# Patient Record
Sex: MALE | Race: WHITE | URBAN - METROPOLITAN AREA
[De-identification: names, ages, dates, MRNs, and addresses within clinical notes are randomized per-mention and may not be internally consistent; named-entity substitution may affect disease eponyms.]

---

## 2018-05-16 ENCOUNTER — HOSPITAL ENCOUNTER (OUTPATIENT)
Facility: CLINIC | Age: 38
Setting detail: OBSERVATION
Discharge: HOME | End: 2018-05-17
Attending: EMERGENCY MEDICINE | Admitting: HOSPITALIST
Payer: COMMERCIAL

## 2018-05-16 DIAGNOSIS — I26.99 OTHER ACUTE PULMONARY EMBOLISM WITHOUT ACUTE COR PULMONALE (H): ICD-10-CM

## 2018-05-16 PROCEDURE — 76604 US EXAM CHEST: CPT

## 2018-05-16 PROCEDURE — 99285 EMERGENCY DEPT VISIT HI MDM: CPT | Mod: 25

## 2018-05-16 PROCEDURE — 85025 COMPLETE CBC W/AUTO DIFF WBC: CPT | Performed by: EMERGENCY MEDICINE

## 2018-05-16 PROCEDURE — 85379 FIBRIN DEGRADATION QUANT: CPT | Performed by: EMERGENCY MEDICINE

## 2018-05-16 PROCEDURE — 25000128 H RX IP 250 OP 636: Performed by: EMERGENCY MEDICINE

## 2018-05-16 PROCEDURE — 86308 HETEROPHILE ANTIBODY SCREEN: CPT | Performed by: EMERGENCY MEDICINE

## 2018-05-16 PROCEDURE — 96365 THER/PROPH/DIAG IV INF INIT: CPT | Mod: 59

## 2018-05-16 PROCEDURE — 83690 ASSAY OF LIPASE: CPT | Performed by: EMERGENCY MEDICINE

## 2018-05-16 PROCEDURE — 36415 COLL VENOUS BLD VENIPUNCTURE: CPT | Performed by: EMERGENCY MEDICINE

## 2018-05-16 PROCEDURE — 96375 TX/PRO/DX INJ NEW DRUG ADDON: CPT

## 2018-05-16 PROCEDURE — 96376 TX/PRO/DX INJ SAME DRUG ADON: CPT

## 2018-05-16 PROCEDURE — 80053 COMPREHEN METABOLIC PANEL: CPT | Performed by: EMERGENCY MEDICINE

## 2018-05-16 PROCEDURE — 84484 ASSAY OF TROPONIN QUANT: CPT | Performed by: EMERGENCY MEDICINE

## 2018-05-16 RX ORDER — ONDANSETRON 2 MG/ML
4 INJECTION INTRAMUSCULAR; INTRAVENOUS ONCE
Status: COMPLETED | OUTPATIENT
Start: 2018-05-16 | End: 2018-05-16

## 2018-05-16 RX ORDER — KETOROLAC TROMETHAMINE 15 MG/ML
15 INJECTION, SOLUTION INTRAMUSCULAR; INTRAVENOUS ONCE
Status: COMPLETED | OUTPATIENT
Start: 2018-05-16 | End: 2018-05-16

## 2018-05-16 RX ADMIN — KETOROLAC TROMETHAMINE 15 MG: 15 INJECTION, SOLUTION INTRAMUSCULAR; INTRAVENOUS at 23:54

## 2018-05-16 RX ADMIN — ONDANSETRON 4 MG: 2 SOLUTION INTRAMUSCULAR; INTRAVENOUS at 23:53

## 2018-05-16 RX ADMIN — SODIUM CHLORIDE, POTASSIUM CHLORIDE, SODIUM LACTATE AND CALCIUM CHLORIDE 1000 ML: 600; 310; 30; 20 INJECTION, SOLUTION INTRAVENOUS at 23:51

## 2018-05-16 NOTE — IP AVS SNAPSHOT
[facility] Observation Department    [address]    [facility] [address]    Phone:  [phone]                                       After Visit Summary   5/16/2018    Juan Pablo Peterson    MRN: [id number]           After Visit Summary Signature Page     I have received my discharge instructions, and my questions have been answered. I have discussed any challenges I see with this plan with the nurse or doctor.    ..........................................................................................................................................  Patient/Patient Representative Signature      ..........................................................................................................................................  Patient Representative Print Name and Relationship to Patient    ..................................................               ................................................  Date                                            Time    ..........................................................................................................................................  Reviewed by Signature/Title    ...................................................              ..............................................  Date                                                            Time

## 2018-05-16 NOTE — IP AVS SNAPSHOT
MRN:[id number]                      After Visit Summary   5/16/2018    Juan Pablo Peterson    MRN: [id number]           Thank you!     Thank you for choosing [facility] for your care. Our goal is always to provide you with excellent care. Hearing back from our patients is one way we can continue to improve our services. Please take a few minutes to complete the written survey that you may receive in the mail after you visit. If you would like to speak to someone directly about your visit please contact Patient Relations at [phone]. Thank you!          Patient Information     Date Of Birth          1980        About your hospital stay     You were admitted on:  May 17, 2018 You last received care in the:  [facility] Observation Department    You were discharged on:  May 17, 2018        Reason for your hospital stay       She was admitted for chest pain and shortness of breath. You were found to have blood clots in the lung. The cause of the blood clots are unclear but you will need outpatient follow up with Hematology for a genetic work up to assess possible genetic causes of blood clot. Make an appt with your PCP in 1 week and they will need to set you up with a Hematologist through the [facility].   Meanwhile take your blood thinner as instructed.                  Who to Call     For medical emergencies, please call 911.  For non-urgent questions about your medical care, please call your primary care provider or clinic, [phone]          Attending Provider     Provider Specialty    Luis He MD Emergency Medicine    Sergey Saini DO Internal Medicine       Primary Care Provider Office Phone # Fax #    [facility] [phone]      After Care Instructions     Activity       Your activity upon discharge: activity as tolerated            Diet       Follow this diet upon discharge: Orders Placed This  "Encounter      Regular Diet Adult                  Follow-up Appointments     Follow-up and recommended labs and tests        Follow up with primary care provider, [facility], within 7 days for hospital follow- up.  The following labs/tests are recommended: Hypercaogulable studies and follow up with a Hematologist.                             Pending Results     No orders found for last 3 day(s).            Statement of Approval     Ordered          18 1156  I have reviewed and agree with all the recommendations and orders detailed in this document.  EFFECTIVE NOW     Approved and electronically signed by:  Jennifer Raphael PA-C             Admission Information     Date & Time Provider Department Dept. Phone    2018 Sergey Saini,  [facility] [place] [facility] Observation Department [phone]      Your Vitals Were     Blood Pressure Pulse Temperature Respirations Height Weight    118/66 (BP Location: Left arm) 79 98  F (36.7  C) (Oral) 20 1.905 m (6' 3\") 104.3 kg (229 lb 14.4 oz)    Pulse Oximetry BMI (Body Mass Index)                95% 28.74 kg/m2          [employer] Information     [employer] lets you send messages to your doctor, view your test results, renew your prescriptions, schedule appointments and more. To sign up, go to [email or web address] . Click on \"Log in\" on the left side of the screen, which will take you to the Welcome page. Then click on \"Sign up Now\" on the right side of the page.     You will be asked to enter the access code listed below, as well as some personal information. Please follow the directions to create your username and password.     Your access code is: [email or web address]  Expires: 8/15/2018  9:54 AM     Your access code will  in 90 days. If you need help or a new code, please call your [place] clinic or [phone].        Care EveryWhere ID     This is your Care EveryWhere ID. This could be used by other organizations to access your " [facility] medical records  [id number]        Equal Access to Services     HAYDEE KEITH : Hadii aad ku hadkavonbarrera Amin, wameida lucammy, qacadenta kamarycolin allison, shekhar monacoviktoriatonya deluna. So [facility] [phone].    ATENCIÓN: Si habla español, tiene a tolbert disposición servicios gratuitos de asistencia lingüística. Llame al [phone].    We comply with applicable federal civil rights laws and [place] laws. We do not discriminate on the basis of race, color, national origin, age, disability, sex, sexual orientation, or gender identity.               Review of your medicines      START taking        Dose / Directions    acetaminophen 325 MG tablet   Commonly known as:  TYLENOL   Used for:  Other acute pulmonary embolism without acute cor pulmonale (H)        Dose:  650 mg   Take 2 tablets (650 mg) by mouth every 4 hours as needed for mild pain   Quantity:  100 tablet   Refills:  0       * rivaroxaban ANTICOAGULANT 15 MG Tabs tablet   Commonly known as:  XARELTO   Used for:  Other acute pulmonary embolism without acute cor pulmonale (H)        Dose:  15 mg   Take 1 tablet (15 mg) by mouth 2 times daily (with meals) for 21 days   Quantity:  42 tablet   Refills:  0       * rivaroxaban ANTICOAGULANT 20 MG Tabs tablet   Commonly known as:  XARELTO   Used for:  Other acute pulmonary embolism without acute cor pulmonale (H)        Dose:  20 mg   Start taking on:  6/8/2018   Take 1 tablet (20 mg) by mouth daily (with dinner)   Quantity:  30 tablet   Refills:  1       * Notice:  This list has 2 medication(s) that are the same as other medications prescribed for you. Read the directions carefully, and ask your doctor or other care provider to review them with you.      CONTINUE these medicines which have NOT CHANGED        Dose / Directions    BUSPAR PO        Dose:  10 mg   Take 10 mg by mouth 2 times daily   Refills:  0       etanercept 25 MG vial injection kit   Commonly known as:  ENBREL        Dose:  25  mg   Inject 25 mg Subcutaneous once a week   Refills:  0       HYDROXYZINE PAMOATE PO        Dose:  25-50 mg   Take 25-50 mg by mouth 3 times daily as needed for anxiety   Refills:  0       QUEtiapine 25 MG tablet   Commonly known as:  SEROquel        Dose:  12.5-25 mg   Take 12.5-25 mg by mouth 3 times daily as needed   Refills:  0       venlafaxine 150 MG Tb24 24 hr tablet   Commonly known as:  EFFEXOR-ER        Dose:  150 mg   Take 150 mg by mouth 2 times daily   Refills:  0            Where to get your medicines      These medications were sent to [place], [place] - [address] [facility]  [address] [facility] [address]     Phone:  [phone]     rivaroxaban ANTICOAGULANT 15 MG Tabs tablet    rivaroxaban ANTICOAGULANT 20 MG Tabs tablet         Some of these will need a paper prescription and others can be bought over the counter. Ask your nurse if you have questions.     You don't need a prescription for these medications     acetaminophen 325 MG tablet                Protect others around you: Learn how to safely use, store and throw away your medicines at www.disposemymeds.org.             Medication List: This is a list of all your medications and when to take them. Check marks below indicate your daily home schedule. Keep this list as a reference.      Medications           Morning Afternoon Evening Bedtime As Needed    acetaminophen 325 MG tablet   Commonly known as:  TYLENOL   Take 2 tablets (650 mg) by mouth every 4 hours as needed for mild pain                                   BUSPAR PO   Take 10 mg by mouth 2 times daily                                      etanercept 25 MG vial injection kit   Commonly known as:  ENBREL   Inject 25 mg Subcutaneous once a week                         As directed       HYDROXYZINE PAMOATE PO   Take 25-50 mg by mouth 3 times daily as needed for anxiety                                   QUEtiapine 25 MG tablet   Commonly known  as:  SEROquel   Take 12.5-25 mg by mouth 3 times daily as needed                                   * rivaroxaban ANTICOAGULANT 15 MG Tabs tablet   Commonly known as:  XARELTO   Take 1 tablet (15 mg) by mouth 2 times daily (with meals) for 21 days   Last time this was given:  15 mg on 5/17/2018 12:01 PM                                      * rivaroxaban ANTICOAGULANT 20 MG Tabs tablet   Commonly known as:  XARELTO   Take 1 tablet (20 mg) by mouth daily (with dinner)   Start taking on:  6/8/2018   Last time this was given:  15 mg on 5/17/2018 12:01 PM                                   venlafaxine 150 MG Tb24 24 hr tablet   Commonly known as:  EFFEXOR-ER   Take 150 mg by mouth 2 times daily                                      * Notice:  This list has 2 medication(s) that are the same as other medications prescribed for you. Read the directions carefully, and ask your doctor or other care provider to review them with you.

## 2018-05-17 ENCOUNTER — APPOINTMENT (OUTPATIENT)
Dept: ULTRASOUND IMAGING | Facility: CLINIC | Age: 38
End: 2018-05-17
Attending: EMERGENCY MEDICINE
Payer: COMMERCIAL

## 2018-05-17 ENCOUNTER — APPOINTMENT (OUTPATIENT)
Dept: CT IMAGING | Facility: CLINIC | Age: 38
End: 2018-05-17
Attending: EMERGENCY MEDICINE
Payer: COMMERCIAL

## 2018-05-17 ENCOUNTER — APPOINTMENT (OUTPATIENT)
Dept: CARDIOLOGY | Facility: CLINIC | Age: 38
End: 2018-05-17
Attending: HOSPITALIST
Payer: COMMERCIAL

## 2018-05-17 VITALS
OXYGEN SATURATION: 95 % | HEART RATE: 79 BPM | RESPIRATION RATE: 20 BRPM | DIASTOLIC BLOOD PRESSURE: 66 MMHG | HEIGHT: 75 IN | SYSTOLIC BLOOD PRESSURE: 118 MMHG | WEIGHT: 229.9 LBS | BODY MASS INDEX: 28.58 KG/M2 | TEMPERATURE: 98 F

## 2018-05-17 PROBLEM — I26.99 PULMONARY EMBOLI (H): Status: ACTIVE | Noted: 2018-05-17

## 2018-05-17 LAB
ALBUMIN SERPL-MCNC: 3.1 G/DL (ref 3.4–5)
ALP SERPL-CCNC: 102 U/L (ref 40–150)
ALT SERPL W P-5'-P-CCNC: 60 U/L (ref 0–70)
ANION GAP SERPL CALCULATED.3IONS-SCNC: 10 MMOL/L (ref 3–14)
AST SERPL W P-5'-P-CCNC: 50 U/L (ref 0–45)
BASOPHILS # BLD AUTO: 0 10E9/L (ref 0–0.2)
BASOPHILS NFR BLD AUTO: 0.4 %
BILIRUB SERPL-MCNC: 0.2 MG/DL (ref 0.2–1.3)
BUN SERPL-MCNC: 17 MG/DL (ref 7–30)
CALCIUM SERPL-MCNC: 8.1 MG/DL (ref 8.5–10.1)
CHLORIDE SERPL-SCNC: 104 MMOL/L (ref 94–109)
CO2 SERPL-SCNC: 21 MMOL/L (ref 20–32)
CREAT SERPL-MCNC: 0.9 MG/DL (ref 0.66–1.25)
D DIMER PPP FEU-MCNC: 1.5 UG/ML FEU (ref 0–0.5)
DIFFERENTIAL METHOD BLD: ABNORMAL
EOSINOPHIL # BLD AUTO: 0.1 10E9/L (ref 0–0.7)
EOSINOPHIL NFR BLD AUTO: 0.5 %
ERYTHROCYTE [DISTWIDTH] IN BLOOD BY AUTOMATED COUNT: 12.5 % (ref 10–15)
ERYTHROCYTE [DISTWIDTH] IN BLOOD BY AUTOMATED COUNT: 12.5 % (ref 10–15)
GFR SERPL CREATININE-BSD FRML MDRD: >90 ML/MIN/1.7M2
GLUCOSE SERPL-MCNC: 107 MG/DL (ref 70–99)
HCT VFR BLD AUTO: 39.6 % (ref 40–53)
HCT VFR BLD AUTO: 39.7 % (ref 40–53)
HETEROPH AB SER QL: NEGATIVE
HGB BLD-MCNC: 13.2 G/DL (ref 13.3–17.7)
HGB BLD-MCNC: 13.7 G/DL (ref 13.3–17.7)
IMM GRANULOCYTES # BLD: 0 10E9/L (ref 0–0.4)
IMM GRANULOCYTES NFR BLD: 0.2 %
INTERPRETATION ECG - MUSE: NORMAL
LIPASE SERPL-CCNC: 107 U/L (ref 73–393)
LMWH PPP CHRO-ACNC: 0.49 IU/ML
LYMPHOCYTES # BLD AUTO: 1 10E9/L (ref 0.8–5.3)
LYMPHOCYTES NFR BLD AUTO: 10.2 %
MCH RBC QN AUTO: 29.8 PG (ref 26.5–33)
MCH RBC QN AUTO: 30.2 PG (ref 26.5–33)
MCHC RBC AUTO-ENTMCNC: 33.2 G/DL (ref 31.5–36.5)
MCHC RBC AUTO-ENTMCNC: 34.6 G/DL (ref 31.5–36.5)
MCV RBC AUTO: 87 FL (ref 78–100)
MCV RBC AUTO: 90 FL (ref 78–100)
MONOCYTES # BLD AUTO: 0.6 10E9/L (ref 0–1.3)
MONOCYTES NFR BLD AUTO: 6.3 %
NEUTROPHILS # BLD AUTO: 7.6 10E9/L (ref 1.6–8.3)
NEUTROPHILS NFR BLD AUTO: 82.4 %
NRBC # BLD AUTO: 0 10*3/UL
NRBC BLD AUTO-RTO: 0 /100
PLATELET # BLD AUTO: 199 10E9/L (ref 150–450)
PLATELET # BLD AUTO: 214 10E9/L (ref 150–450)
POTASSIUM SERPL-SCNC: 3.3 MMOL/L (ref 3.4–5.3)
POTASSIUM SERPL-SCNC: 3.7 MMOL/L (ref 3.4–5.3)
PROT SERPL-MCNC: 7.2 G/DL (ref 6.8–8.8)
RADIOLOGIST FLAGS: ABNORMAL
RBC # BLD AUTO: 4.43 10E12/L (ref 4.4–5.9)
RBC # BLD AUTO: 4.54 10E12/L (ref 4.4–5.9)
SODIUM SERPL-SCNC: 135 MMOL/L (ref 133–144)
TROPONIN I SERPL-MCNC: <0.015 UG/L (ref 0–0.04)
WBC # BLD AUTO: 9.3 10E9/L (ref 4–11)
WBC # BLD AUTO: 9.3 10E9/L (ref 4–11)

## 2018-05-17 PROCEDURE — 93306 TTE W/DOPPLER COMPLETE: CPT | Mod: 26 | Performed by: INTERNAL MEDICINE

## 2018-05-17 PROCEDURE — 36415 COLL VENOUS BLD VENIPUNCTURE: CPT | Performed by: PHYSICIAN ASSISTANT

## 2018-05-17 PROCEDURE — 99236 HOSP IP/OBS SAME DATE HI 85: CPT | Performed by: HOSPITALIST

## 2018-05-17 PROCEDURE — 25000128 H RX IP 250 OP 636: Performed by: HOSPITALIST

## 2018-05-17 PROCEDURE — 36415 COLL VENOUS BLD VENIPUNCTURE: CPT | Performed by: HOSPITALIST

## 2018-05-17 PROCEDURE — 85027 COMPLETE CBC AUTOMATED: CPT | Performed by: HOSPITALIST

## 2018-05-17 PROCEDURE — 25000128 H RX IP 250 OP 636: Performed by: EMERGENCY MEDICINE

## 2018-05-17 PROCEDURE — 85520 HEPARIN ASSAY: CPT | Performed by: HOSPITALIST

## 2018-05-17 PROCEDURE — 93306 TTE W/DOPPLER COMPLETE: CPT

## 2018-05-17 PROCEDURE — G0378 HOSPITAL OBSERVATION PER HR: HCPCS

## 2018-05-17 PROCEDURE — 84132 ASSAY OF SERUM POTASSIUM: CPT | Performed by: PHYSICIAN ASSISTANT

## 2018-05-17 PROCEDURE — 71260 CT THORAX DX C+: CPT

## 2018-05-17 PROCEDURE — 99207 ZZC CDG-CODE CATEGORY CHANGED: CPT | Performed by: HOSPITALIST

## 2018-05-17 PROCEDURE — 96366 THER/PROPH/DIAG IV INF ADDON: CPT

## 2018-05-17 PROCEDURE — 93970 EXTREMITY STUDY: CPT

## 2018-05-17 PROCEDURE — 25000132 ZZH RX MED GY IP 250 OP 250 PS 637: Performed by: PHYSICIAN ASSISTANT

## 2018-05-17 PROCEDURE — 25000132 ZZH RX MED GY IP 250 OP 250 PS 637: Performed by: HOSPITALIST

## 2018-05-17 RX ORDER — VENLAFAXINE HYDROCHLORIDE 150 MG/1
150 TABLET, EXTENDED RELEASE ORAL 2 TIMES DAILY
COMMUNITY

## 2018-05-17 RX ORDER — NITROGLYCERIN 0.4 MG/1
0.4 TABLET SUBLINGUAL EVERY 5 MIN PRN
Status: DISCONTINUED | OUTPATIENT
Start: 2018-05-17 | End: 2018-05-17 | Stop reason: HOSPADM

## 2018-05-17 RX ORDER — BISACODYL 10 MG
10 SUPPOSITORY, RECTAL RECTAL DAILY PRN
Status: DISCONTINUED | OUTPATIENT
Start: 2018-05-17 | End: 2018-05-17 | Stop reason: HOSPADM

## 2018-05-17 RX ORDER — LIDOCAINE 40 MG/G
CREAM TOPICAL
Status: DISCONTINUED | OUTPATIENT
Start: 2018-05-17 | End: 2018-05-17

## 2018-05-17 RX ORDER — ACETAMINOPHEN 650 MG/1
650 SUPPOSITORY RECTAL EVERY 4 HOURS PRN
Status: DISCONTINUED | OUTPATIENT
Start: 2018-05-17 | End: 2018-05-17 | Stop reason: HOSPADM

## 2018-05-17 RX ORDER — ONDANSETRON 4 MG/1
4 TABLET, ORALLY DISINTEGRATING ORAL EVERY 6 HOURS PRN
Status: DISCONTINUED | OUTPATIENT
Start: 2018-05-17 | End: 2018-05-17 | Stop reason: HOSPADM

## 2018-05-17 RX ORDER — ONDANSETRON 2 MG/ML
4 INJECTION INTRAMUSCULAR; INTRAVENOUS EVERY 6 HOURS PRN
Status: DISCONTINUED | OUTPATIENT
Start: 2018-05-17 | End: 2018-05-17 | Stop reason: HOSPADM

## 2018-05-17 RX ORDER — ACETAMINOPHEN 325 MG/1
650 TABLET ORAL EVERY 4 HOURS PRN
Status: DISCONTINUED | OUTPATIENT
Start: 2018-05-17 | End: 2018-05-17 | Stop reason: HOSPADM

## 2018-05-17 RX ORDER — POTASSIUM CHLORIDE 29.8 MG/ML
20 INJECTION INTRAVENOUS
Status: DISCONTINUED | OUTPATIENT
Start: 2018-05-17 | End: 2018-05-17 | Stop reason: HOSPADM

## 2018-05-17 RX ORDER — MAGNESIUM SULFATE HEPTAHYDRATE 40 MG/ML
4 INJECTION, SOLUTION INTRAVENOUS EVERY 4 HOURS PRN
Status: DISCONTINUED | OUTPATIENT
Start: 2018-05-17 | End: 2018-05-17 | Stop reason: HOSPADM

## 2018-05-17 RX ORDER — POTASSIUM CHLORIDE 7.45 MG/ML
10 INJECTION INTRAVENOUS
Status: DISCONTINUED | OUTPATIENT
Start: 2018-05-17 | End: 2018-05-17 | Stop reason: HOSPADM

## 2018-05-17 RX ORDER — ACETAMINOPHEN 325 MG/1
650 TABLET ORAL EVERY 4 HOURS PRN
Qty: 100 TABLET | Refills: 0 | COMMUNITY
Start: 2018-05-17

## 2018-05-17 RX ORDER — POTASSIUM CL/LIDO/0.9 % NACL 10MEQ/0.1L
10 INTRAVENOUS SOLUTION, PIGGYBACK (ML) INTRAVENOUS
Status: DISCONTINUED | OUTPATIENT
Start: 2018-05-17 | End: 2018-05-17 | Stop reason: HOSPADM

## 2018-05-17 RX ORDER — POTASSIUM CHLORIDE 1500 MG/1
20-40 TABLET, EXTENDED RELEASE ORAL
Status: DISCONTINUED | OUTPATIENT
Start: 2018-05-17 | End: 2018-05-17 | Stop reason: HOSPADM

## 2018-05-17 RX ORDER — IOPAMIDOL 755 MG/ML
500 INJECTION, SOLUTION INTRAVASCULAR ONCE
Status: COMPLETED | OUTPATIENT
Start: 2018-05-17 | End: 2018-05-17

## 2018-05-17 RX ORDER — POTASSIUM CHLORIDE 1.5 G/1.58G
20-40 POWDER, FOR SOLUTION ORAL
Status: DISCONTINUED | OUTPATIENT
Start: 2018-05-17 | End: 2018-05-17 | Stop reason: HOSPADM

## 2018-05-17 RX ORDER — OXYCODONE HYDROCHLORIDE 5 MG/1
5 TABLET ORAL EVERY 4 HOURS PRN
Status: DISCONTINUED | OUTPATIENT
Start: 2018-05-17 | End: 2018-05-17 | Stop reason: HOSPADM

## 2018-05-17 RX ORDER — QUETIAPINE FUMARATE 25 MG/1
12.5-25 TABLET, FILM COATED ORAL 3 TIMES DAILY PRN
COMMUNITY

## 2018-05-17 RX ORDER — POLYETHYLENE GLYCOL 3350 17 G/17G
17 POWDER, FOR SOLUTION ORAL DAILY PRN
Status: DISCONTINUED | OUTPATIENT
Start: 2018-05-17 | End: 2018-05-17 | Stop reason: HOSPADM

## 2018-05-17 RX ORDER — NALOXONE HYDROCHLORIDE 0.4 MG/ML
.1-.4 INJECTION, SOLUTION INTRAMUSCULAR; INTRAVENOUS; SUBCUTANEOUS
Status: DISCONTINUED | OUTPATIENT
Start: 2018-05-17 | End: 2018-05-17 | Stop reason: HOSPADM

## 2018-05-17 RX ADMIN — OXYCODONE HYDROCHLORIDE 5 MG: 5 TABLET ORAL at 06:44

## 2018-05-17 RX ADMIN — SODIUM CHLORIDE 65 ML: 9 INJECTION, SOLUTION INTRAVENOUS at 01:01

## 2018-05-17 RX ADMIN — Medication 7200 UNITS: at 02:32

## 2018-05-17 RX ADMIN — POTASSIUM CHLORIDE 40 MEQ: 1500 TABLET, EXTENDED RELEASE ORAL at 03:28

## 2018-05-17 RX ADMIN — IOPAMIDOL 100 ML: 755 INJECTION, SOLUTION INTRAVENOUS at 01:01

## 2018-05-17 RX ADMIN — HEPARIN SODIUM 1600 UNITS/HR: 10000 INJECTION, SOLUTION INTRAVENOUS at 03:24

## 2018-05-17 RX ADMIN — HEPARIN SODIUM 1600 UNITS/HR: 10000 INJECTION, SOLUTION INTRAVENOUS at 02:37

## 2018-05-17 RX ADMIN — RIVAROXABAN 15 MG: 15 TABLET, FILM COATED ORAL at 12:01

## 2018-05-17 RX ADMIN — POTASSIUM CHLORIDE 20 MEQ: 1500 TABLET, EXTENDED RELEASE ORAL at 05:51

## 2018-05-17 ASSESSMENT — ENCOUNTER SYMPTOMS
SORE THROAT: 0
APPETITE CHANGE: 1
COUGH: 1
ABDOMINAL PAIN: 1
VOMITING: 1
CHILLS: 1
SHORTNESS OF BREATH: 1
CHEST TIGHTNESS: 1
FEVER: 1
DIAPHORESIS: 1
NAUSEA: 1
CONSTIPATION: 1
LIGHT-HEADEDNESS: 1

## 2018-05-17 NOTE — PHARMACY-ADMISSION MEDICATION HISTORY
Admission medication history interview status for this patient is complete. See [facility] admission navigator for allergy information, prior to admission medications and immunization status.     Medication history interview source(s):Patient  Medication history resources (including written lists, pill bottles, clinic record): epic list, Rx bottles  Primary pharmacy:[facility] -[phone]    Changes made to PTA medication list:  Added: doses for hydroxyzine, quetiapine and venlafaxine  Deleted: none  Changed: none  Enbrel: didn't take yesterday, as was told not to take when feeling ill/infection.    Actions taken by pharmacist (provider contacted, etc):None     Additional medication history information:None    Medication reconciliation/reorder completed by provider prior to medication history? Yes    Do you take OTC medications (eg tylenol, ibuprofen, fish oil, eye/ear drops, etc)? N(Y/N)    For patients on insulin therapy: N (Y/N)  Lantus/levemir/NPH/Mix 70/30 dose:   (Y/N) (see Med list for doses)   Sliding scale Novolog Y/N  If Yes, do you have a baseline novolog pre-meal dose:  units with meals  Patients eat three meals a day:   Y/N    How many episodes of hypoglycemia do you have per week: _______  How many missed doses do you have per week: ______  How many times do you check your blood glucose per day: _______   Any Barriers to therapy - Be specific :  cost of medications, comfortable with giving injections (if applicable), comfortable and confident with current diabetes regimen: Y/N ______________      Prior to Admission medications    Medication Sig Last Dose Taking? Auth Provider   BusPIRone HCl (BUSPAR PO) Take 10 mg by mouth 2 times daily 5/16/2018 at Unknown time Yes Reported, Patient   etanercept (ENBREL) 25 MG vial injection kit Inject 25 mg Subcutaneous once a week 5/9/2018 at Unknown time Yes Reported, Patient   HYDROXYZINE PAMOATE PO Take 25-50 mg by mouth 3 times daily as needed for anxiety   5/16/2018 at Unknown time Yes Reported, Patient   QUEtiapine (SEROQUEL) 25 MG tablet Take 12.5-25 mg by mouth 3 times daily as needed 5/16/2018 at Unknown time Yes Unknown, Entered By History   venlafaxine (EFFEXOR-ER) 150 MG TB24 24 hr tablet Take 150 mg by mouth 2 times daily 5/16/2018 at Unknown time Yes Unknown, Entered By History

## 2018-05-17 NOTE — PLAN OF CARE
Problem: Patient Care Overview  Goal: Plan of Care/Patient Progress Review  Outcome: No Change  PRIMARY DIAGNOSIS: pulmonary embolus  OUTPATIENT/OBSERVATION GOALS TO BE MET BEFORE DISCHARGE:  1. ADLs back to baseline: Yes    2. Activity and level of assistance: Ambulating independently.    3. Pain status: states has minimal pain    4. Return to near baseline physical activity: Yes    5. Started heparin gtt after bolus received in ED, plan will be for bridging of anti coagulant for home       Discharge Planner Nurse   Safe discharge environment identified: Yes  Barriers to discharge: No       Entered by: Nikkie Thomas 05/17/2018 3:32 AM     Please review provider order for any additional goals.   Nurse to notify provider when observation goals have been met and patient is ready for discharge.

## 2018-05-17 NOTE — PLAN OF CARE
Problem: Patient Care Overview  Goal: Plan of Care/Patient Progress Review  Outcome: Improving  PRIMARY DIAGNOSIS: PE  OUTPATIENT/OBSERVATION GOALS TO BE MET BEFORE DISCHARGE:  1. ADLs back to baseline: Yes    2. Activity and level of assistance: Ambulating independently.    3. Pain status: Improved-controlled with oral pain medications.    4. Return to near baseline physical activity: Yes     Discharge Planner Nurse   Safe discharge environment identified: Yes  Barriers to discharge: No       Entered by: Nettie Cassidy 05/17/2018 11:55 AM     Please review provider order for any additional goals.   Nurse to notify provider when observation goals have been met and patient is ready for discharge.

## 2018-05-17 NOTE — ED PROVIDER NOTES
History     Chief Complaint:  Shortness of Breath and Chest Pain    HPI   Juan Pablo Peterson is a 37 year old male with a history of pericardial effusion who presents with shortness of breath and chest pain. The patient reports that he had pericarditis likely due to viral infection in 2009; at that time, he was given steroids and ibuprofen and monitored closely as his symptoms resolved without complications. The patient reports feeling fine recently though has been feeling tired and sleeping up to 12 hours a day for the past month. For the past 3 days, the patient has had decreased appetite due to nausea and vomiting, diarrhea without black or bloody stools, dry cough, and fever which has resolved. He additionally notes chest tightness with deep breathing, lightheadedness with standing, and worsening shortness of breath with exertion and laying flat. He states that he has had difficulty sleeping due to abdominal or side discomfort for the past 3 days with alternating chills and sweats. Today he noted feeling dry in the mouth despite good fluid intake. He denies sore throat. His daughter had similar flu like symptoms last week. Of note, the patient has no history of mononucleosis in the past.     CARDIAC RISK FACTORS:  Sex:    Male   Tobacco:   No   Hypertension:   No   Hyperlipidemia:  No   Diabetes:   No   Family History:  No     PE/DVT RISK FACTORS:  Sex:    Male   Tobacco:   No   Cancer:   No   Travel:   No   Surgery:   No   Personal history:  No   Family history:  No       Allergies:  No known drug allergies      Medications:    Buspar  Enbrel  Hydroxyzine  Quetiapine fumarate  Venlafaxine     Past Medical History:    Ankylosing spondylitis  Anxiety  Depressive disorder  Pericardial effusion    Past Surgical History:    History reviewed. No pertinent surgical history.     Family History:    History review. No contributing family history.     Social History:  Smoking status: no  Alcohol use: no   PCP: No primary  "care provider on file.   Patient presents with family     Review of Systems   Constitutional: Positive for appetite change, chills, diaphoresis and fever (resolved).   HENT: Negative for sore throat.    Respiratory: Positive for cough, chest tightness and shortness of breath.    Cardiovascular: Positive for chest pain.   Gastrointestinal: Positive for abdominal pain, constipation, nausea and vomiting.   Neurological: Positive for light-headedness.   All other systems reviewed and are negative.    Physical Exam     Patient Vitals for the past 24 hrs:   BP Temp Heart Rate Resp SpO2 Height Weight   05/17/18 0115 121/72 - - - 98 % - -   05/17/18 0045 129/76 - 81 21 95 % - -   05/17/18 0030 128/77 - 84 16 97 % - -   05/17/18 0015 133/76 - 86 27 95 % - -   05/17/18 0000 - - 84 21 96 % - -   05/16/18 2345 - - 85 27 95 % - -   05/16/18 2330 - - 87 (!) 31 95 % - -   05/16/18 2315 - - 89 (!) 34 96 % - -   05/16/18 2300 122/77 - 85 28 96 % - -   05/16/18 2245 - - - - 96 % - -   05/16/18 2235 122/79 96.6  F (35.9  C) 105 18 97 % 1.905 m (6' 3\") 102.1 kg (225 lb)     Physical Exam   HENT:   Right Ear: External ear normal.   Left Ear: External ear normal.   Nose: Nose normal.   Eyes: Conjunctivae and lids are normal.   Neck: Neck supple. No tracheal deviation present.   Cardiovascular: Regular rhythm and intact distal pulses.    No murmur heard.  tachycardic   Pulmonary/Chest: He has no wheezes. He has no rales.   tachypnea   Abdominal: Soft. There is no tenderness. There is no rebound and no guarding.   Musculoskeletal:   No peripheral edema   Neurological:   MAEE, no gross focal motor or sensory deficit   Skin: Skin is warm. He is diaphoretic.   Psychiatric: He has a normal mood and affect.   Nursing note and vitals reviewed.        Emergency Department Course   ECG (22:44):  Rate 95 bpm. AZ interval 156. QT/QTc 320/402.   Normal sinus rhythm. Possible inferior infarct, age undetermined. Abnormal ECG  Interpreted at 2249 by " Luis He    Imaging:  Radiographic findings were communicated with the patient who voiced understanding of the findings.    CT-scan Chest/Abdomen/Pelvis w/ contrast:  1. Study is positive for a few small bilateral pulmonary emboli.  2. Moderate small bowel and colonic fluid which may be due to  enteritis.  Preliminary result per radiology.      Ultrasound Lower Extremity Venous Duplex Bilateral  Pending     Laboratory:  CBC: WNL (WBC 9.3, HGB 13.7, )   CMP: Potassium 3.3 (L), Glucose 107 (H), Calcium 8.1 (L), Albumin 3.1 (L), AST 50 (H), o/w WNL (Creatinine 0.90)  D dimer: 1.5 (H)  Lipase: 107  Mononucleosis: Negative  Troponin: <0.015    Procedures:  Results for orders placed during the hospital encounter of 05/16/18   POC US CHEST B-SCAN    Impression [facility] Procedure Note      Limited Bedside ED Cardiac Ultrasound:    PROCEDURE: PERFORMED BY: Dr. Luis He  INDICATIONS/SYMPTOM:  Chest Pain and Shortness of Breath  PROBE: Cardiac phased array probe  BODY LOCATION: Chest  FINDINGS:   The ultrasound was performed utilizing the parasternal long axis and apical 4 chamber views.  Cardiac contractility:  Present  Gross estimation of cardiac kinesis: normal  Pericardial Effusion:  None  RV:LV ratio: LV > RV  + lung sliding bilateral, no pleural effusion, RUQ no cholelithiasis, no RUQ ff.     INTERPRETATION:    Chamber size and motion were grossly normal with LV > RV, normal cardiac kinesis.  No pericardial effusion was found. No Ptx, or pleural effusion, limited RUQ views show no cholelithiasis or free fluid    IMAGE DOCUMENTATION: Images were archived to PACs system.              Interventions:  2351: Lactated ringers bolus 1000 mL IV  2353: Zofran 4mg IV   2354: Toradol 15 mg IV  Heparin pending     Emergency Department Course:  Past medical records, nursing notes, and vitals reviewed.  2251: I performed an exam of the patient and obtained history, as documented above. GCS  15.  IV inserted and blood drawn.   The patient was taken for CT, see imaging results above.    0029: I rechecked and updated the patient.   0127: I discussed results of the CT with Dr. Garcia  Findings and plan explained to the Patient who consents to admission.     0159: Discussed the patient with Dr. Saini, who will admit the patient to a bed for further monitoring, evaluation, and treatment.      Impression & Plan      Medical Decision Making:  Juan Pablo Peterson is a 37 year old male who presents with chest pain and shortness of breath found to have bilateral pulmonary emboli. No hypoxia and troponin is negative. No obvious rate heart screen on limited bedside echo here. Etiology of his clot is unclear as he has no classic risk factors. We will do ultrasound of his lower extremities and start him on anticoagulation. Recommended for admission for symptom management as well as consideration of further workup given his young age and lack of classic risk factors. He does have a low PESI score.      Diagnosis:    ICD-10-CM    1. Other acute pulmonary embolism without acute cor pulmonale (H) I26.99        Disposition:  Admitted to Dr. Parveen Mccall  5/16/2018   [facility] EMERGENCY DEPARTMENT  Amanda BANEGAS, am serving as a scribe at 10:51 PM on 5/16/2018 to document services personally performed by Luis He based on my observations and the provider's statements to me.        Luis He MD  05/17/18 0319

## 2018-05-17 NOTE — DISCHARGE SUMMARY
[facility] Outpatient / Observation Unit  Discharge Summary        Juan Pablo Peterson MRN# [id number]   YOB: 1980 Age: 37 year old     Date of Admission:  5/16/2018  Date of Discharge:  5/17/2018 12:54 PM  Admitting Physician:  Sergey Saini DO  Discharge Physician: Jennifer Raphael PA-C  Discharging Service: Hospitalist      Primary Provider: Clinic, [facility]  Primary Care Physician Phone Number: [phone]         Primary Discharge Diagnoses:    Juan Pablo Peterson was admitted on 5/16/2018 for acute PE    1. Acute BL PE-unprovoked        Secondary Discharge Diagnoses:     Past Medical History:   Diagnosis Date     Ankylosing spondylitis (H)      Anxiety      Depressive disorder      Pericardial effusion                 Code Status:      Full Code        Brief Hospital Summary:        Reason for your hospital stay       She was admitted for chest pain and shortness of breath. You were found   to have blood clots in the lung. The cause of the blood clots are unclear   but you will need outpatient follow up with Hematology for a genetic work   up to assess possible genetic causes of blood clot. Make an appt with your   PCP in 1 week and they will need to set you up with a Hematologist through   the [facility]. Meanwhile take your blood thinner as instructed.          Please refer to initial admission history and physical for further details.   Briefly, Juan Pablo Peterson is a 37 year old male with a history of ankylosing spondylitis, arthritis, anxiety, depression, and pericardial effusion with viral pericarditis who presents with chest pain and shortness of breath found to have bilateral pulmonary emboli.     1.  Unprovoked bilateral pulmonary emboli: No obvious provoking factor by history.  He is currently hemodynamically stable without any indication for thrombolytics nor IVC filter.  Bilateral leg ultrasound is negative.  Given the lack of identification of a provoking  etiology, a hypercoagulable workup is indicated.  I have recommended that he follow with hematology as an outpatient to have this performed.  An ECHO was performed and no signs of right heart strain.   He was placed on  Xarelto and he needs to follow up with PCP in 1 week and obtain a referral to Hematology with PN.     2.  Suspected recent viral gastroenteritis: Patient describes some vague GI symptoms such as nausea, vomiting, and diarrhea.  He denies fever over the past few days but did have low-grade fever over the weekend.  In any event his GI symptoms have resolved.     3.  Ankylosing spondylitis and arthritis: Chronically takes Enbrel. Resume as outpt.      4.  Depression and anxiety: stable.      5.  Previous viral pericarditis: No effusion was seen in CT scan or ED ultrasound.  ECHO negative for right heart strain.      6.  Hypokalemia: resolved.            Significant Lab During Hospitalization:        Recent Labs  Lab 05/17/18  0318 05/16/18  2355   WBC 9.3 9.3   HGB 13.2* 13.7   HCT 39.7* 39.6*   MCV 90 87    199       Recent Labs  Lab 05/17/18  0907 05/16/18  2355   NA  --  135   POTASSIUM 3.7 3.3*   CHLORIDE  --  104   CO2  --  21   ANIONGAP  --  10   GLC  --  107*   BUN  --  17   CR  --  0.90   GFRESTIMATED  --  >90   GFRESTBLACK  --  >90   DORIAN  --  8.1*     No results for input(s): CULT in the last 168 hours.             Significant Imaging During Hospitalization:      Results for orders placed or performed during the hospital encounter of 05/16/18   POC US CHEST B-SCAN    Impression    [facility] Procedure Note      Limited Bedside ED Cardiac Ultrasound:    PROCEDURE: PERFORMED BY: Dr. Luis He  INDICATIONS/SYMPTOM:  Chest Pain and Shortness of Breath  PROBE: Cardiac phased array probe  BODY LOCATION: Chest  FINDINGS:   The ultrasound was performed utilizing the parasternal long axis and apical 4 chamber views.  Cardiac contractility:  Present  Gross estimation of cardiac  kinesis: normal  Pericardial Effusion:  None  RV:LV ratio: LV > RV  + lung sliding bilateral, no pleural effusion, RUQ no cholelithiasis, no RUQ ff.     INTERPRETATION:    Chamber size and motion were grossly normal with LV > RV, normal cardiac kinesis.  No pericardial effusion was found. No Ptx, or pleural effusion, limited RUQ views show no cholelithiasis or free fluid    IMAGE DOCUMENTATION: Images were archived to PACs system.         CT Chest/Abdomen/Pelvis w Contrast     Value    Radiologist flags Pulmonary embolism (AA)    Narrative    CT CHEST/ABDOMEN/PELVIS W CONTRAST 5/17/2018 1:16 AM     HISTORY: Chest pain and dyspnea. Positive d-dimer. Possible pulmonary  embolism. Patient also has right upper quadrant and right lower  quadrant abdominal pain.     TECHNIQUE: Volumetric acquisition through chest, abdomen and pelvis  with IV contrast.   100mL Isovue-370  Radiation dose for this scan was  reduced using automated exposure control, adjustment of the mA and/or  kV according to patient size, or iterative reconstruction technique.    COMPARISON: None.    FINDINGS:   Chest: There is a small pulmonary embolus in the left upper lobe  pulmonary artery extending slightly into left upper lobe and lingular  branch vessels. There are 2 small subtle emboli in the right upper  lung. Small embolus in the right middle lobe. Some of the peripheral  pulmonary arteries are not optimally opacified and there is some  motion artifact. No mediastinal or hilar abnormality. No acute  infiltrates, pleural effusions or pneumothorax. Linear atelectasis or  scarring right lower lung.    Abdomen and pelvis: Liver, gallbladder, spleen, pancreas, adrenal  glands and kidneys demonstrate no worrisome findings. Small cyst lower  right kidney. No hydronephrosis.    Pelvic structures within normal limits. Moderate bowel fluid including  fluid throughout the colon. No focal inflammatory changes. Multiple  small mesenteric lymph nodes. No bowel  obstruction or ascites.      Impression    IMPRESSION:  1. Study is positive for a few small bilateral pulmonary emboli.  2. Moderate small bowel and colonic fluid which may be due to  enteritis.    [Critical Result: Pulmonary embolism]    Finding was identified on 5/17/2018 1:19 AM.     Dr. He was contacted by me on 5/17/2018 1:27 AM and verbalized  understanding of the critical result.     DEREK GASTON MD   US Lower Extremity Venous Duplex Bilateral    Narrative    US LOWER EXTREMITY VENOUS DUPLEX BILATERAL 5/17/2018 2:48 AM     HISTORY: New diagnosis of pulmonary embolism.    TECHNIQUE: Venous Doppler US of bilateral lower extremities.? Color  flow and spectral Doppler with waveform analysis performed.    COMPARISON: CT 5/17/2018.    FINDINGS: Ultrasound of both legs demonstrates no deep vein thrombus  from the common femoral through popliteal veins or in the visualized  segments of posterior tibial or peroneal veins in the calves.      Impression    IMPRESSION: No DVT identified in either leg.    DEREK GASTON MD              Pending Results:        Unresulted Labs Ordered in the Past 30 Days of this Admission     No orders found for last 61 day(s).              Consultations This Hospital Stay:      No consultations were requested during this admission         Discharge Instructions and Follow-Up:      Follow-up Appointments     Follow-up and recommended labs and tests        Follow up with primary care provider, [facility],   within 7 days for hospital follow- up.  The following labs/tests are   recommended: Hypercaogulable studies and follow up with a Hematologist.                    Pt instructed to follow up with PCP in 7 days, consider outpatient Hypercoagulable work up if no DVT provoking factors found.   Follow up appoint in 1-2 days with INR clinic          Discharge Disposition:      Discharged to home         Discharge Medications:        Current Discharge Medication  "List      START taking these medications    Details   acetaminophen (TYLENOL) 325 MG tablet Take 2 tablets (650 mg) by mouth every 4 hours as needed for mild pain  Qty: 100 tablet, Refills: 0    Associated Diagnoses: Other acute pulmonary embolism without acute cor pulmonale (H)      !! rivaroxaban ANTICOAGULANT (XARELTO) 15 MG TABS tablet Take 1 tablet (15 mg) by mouth 2 times daily (with meals) for 21 days  Qty: 42 tablet, Refills: 0    Associated Diagnoses: Other acute pulmonary embolism without acute cor pulmonale (H)      !! rivaroxaban ANTICOAGULANT (XARELTO) 20 MG TABS tablet Take 1 tablet (20 mg) by mouth daily (with dinner)  Qty: 30 tablet, Refills: 1    Associated Diagnoses: Other acute pulmonary embolism without acute cor pulmonale (H)       !! - Potential duplicate medications found. Please discuss with provider.      CONTINUE these medications which have NOT CHANGED    Details   BusPIRone HCl (BUSPAR PO) Take 10 mg by mouth 2 times daily      etanercept (ENBREL) 25 MG vial injection kit Inject 25 mg Subcutaneous once a week      HYDROXYZINE PAMOATE PO Take 25-50 mg by mouth 3 times daily as needed for anxiety       QUEtiapine (SEROQUEL) 25 MG tablet Take 12.5-25 mg by mouth 3 times daily as needed      venlafaxine (EFFEXOR-ER) 150 MG TB24 24 hr tablet Take 150 mg by mouth 2 times daily                 Allergies:       No Known Allergies        Condition and Physical on Discharge:      Discharge condition: Stable   Vitals: Blood pressure 125/65, pulse 79, temperature 98.4  F (36.9  C), temperature source Oral, resp. rate 20, height 1.905 m (6' 3\"), weight 104.3 kg (229 lb 14.4 oz), SpO2 97 %.  229 lbs 14.4 oz      GENERAL:  Comfortable.  PSYCH: pleasant, oriented, No acute distress.  HEENT:  PERRLA. Normal conjunctiva, normal hearing, nasal mucosa and Oropharynx are normal.  NECK:  Supple, no neck vein distention, adenopathy or bruits, normal thyroid.  HEART:  Normal S1, S2 with no murmur, no pericardial " rub, gallops or S3 or S4.  LUNGS:  Clear to auscultation, normal Respiratory effort. No wheezing, rales or ronchi.  ABDOMEN:  Soft, no hepatosplenomegaly, normal bowel sounds. Non-tender, non distended.   EXTREMITIES:  Calves supple and soft, NO swelling.   SKIN:  Dry to touch, No rash, wound or ulcerations.  NEUROLOGIC:  CN 2-12 intact, BL 5/5 symmetric upper and lower extremity strength, sensation is intact with no focal deficits.

## 2018-05-17 NOTE — PLAN OF CARE
Problem: Patient Care Overview  Goal: Plan of Care/Patient Progress Review  Outcome: Improving  At this time patient states that he is very tired and wants to sleep.  Just put on learning board in room a few things to expect for am.  He will need anti coagulant teaching reviewed in am, stated that he was very tired from not having had sleep for the last few days.  Will continue to monitor and assess.

## 2018-05-17 NOTE — ED TRIAGE NOTES
Hx pericardial infusion after influenza type symptoms. Pt reports symptoms began Sunday. Pt c/o CP and SOB and reports symptoms similar to past. Diaphoretic. ABC in tact. A/OX4

## 2018-05-17 NOTE — PROGRESS NOTES
Discharge instructions given, iv removed, filled meds given for home use. Patient wheeled down to vehicle by staff.

## 2018-05-17 NOTE — H&P
[facility]  Hospitalist H&P    Name: Juan Pablo Peterson      MRN: [id number]  YOB: 1980    Age: 37 year old  Date of admission: 5/16/2018  Primary care provider: [facility], [facility] [place]            Assessment and Plan:   Juan Pablo Peterson is a 37 year old male with a history of ankylosing spondylitis, arthritis, anxiety, depression, and pericardial effusion with viral pericarditis who presents with chest pain and shortness of breath found to have bilateral pulmonary emboli.    1.  Unprovoked bilateral pulmonary emboli: No obvious provoking factor by history.  He is currently hemodynamically stable without any indication for thrombolytics nor IVC filter.  Bilateral leg ultrasound is pending.  At this time he has been started on a heparin drip with plans for admission.  I will request pharmacy consultation to price out the novel anticoagulants.  Given the lack of identification of a provoking etiology I think a hypercoagulable workup is indicated.  I have recommended that he follow with hematology as an outpatient to have this performed.  Given his history of a pericardial effusion I think it is reasonable to check an echocardiogram to evaluate for right heart strain or other abnormalities.    2.  Suspected recent viral gastroenteritis: Patient describes some vague GI symptoms such as nausea, vomiting, and diarrhea.  He denies fever over the past few days but did have low-grade fever over the weekend.  In any event his GI symptoms have resolved.    3.  Ankylosing spondylitis and arthritis: Chronically takes Enbrel.  Hold this medication for now.    4.  Depression and anxiety: Hold his prior to admission medications for the time being as I suspect he will discharge later in the day.    5.  Previous viral pericarditis: No effusion was seen in CT scan or ED ultrasound.  Again we will check a formal echocardiogram the morning to evaluate her right heart strain.    6.  Hypokalemia: Likely  "due to recent vomiting and diarrhea.  Replace per protocol.    Code status: Full.  Admit to observation status.  Prophylaxis: Heparin drip.  Disposition: Home later in the day.            Chief Complaint:   Chest pain.         History of Present Illness:   Juan Pablo Peterson is a 37 year old male who presents with chest pain and shortness of breath.  History is obtained from my discussion with the emergency room physician Dr. He as well as the patient and his mother at the bedside.  The electronic medical record was also reviewed.    The patient indicates that last week and he was suffering from \"the flu\".  He was sleeping quite a bit over that time and feeling weak and fatigued.  He reported fever over the weekend but none currently.  For the past 3 days he has had decreased appetite as well as some nausea vomiting and diarrhea.  No bloody stools.  No cough nor shortness of breath.     For the past day he has been experiencing some chest pain and shortness of breath.  The pain is worse with exertion and deep breathing.  He denies any leg swelling.  Due to his history of pericarditis he became concerned and came to the emergency department for evaluation.    Here his potassium slightly low at 3.3.  AST is 50.  CBC is normal.  D-dimer is elevated at 1.5 prompting a CT scan of his chest showing a few small bilateral pulmonary emboli with moderate small bowel and colonic fluid which may be due to enteritis.  He was started on a heparin drip and will be admitted for further evaluation.  To note he denies any significant recent immobility, smoking, hormonal supplements, recent long plane flights or drives, family history of venous thromboembolisms, surgery, or malignancy.  He has no other complaints at this time.            Past Medical History:     Past Medical History:   Diagnosis Date     Ankylosing spondylitis (H)      Anxiety      Depressive disorder      Pericardial effusion              Past Surgical History: " "  History reviewed. No pertinent surgical history.          Social History:     Social History   Substance Use Topics     Smoking status: Never Smoker     Smokeless tobacco: Never Used     Alcohol use No             Family History:   The family history was fully reviewed and non-contributory in this case.         Allergies:   No Known Allergies          Medications:     Prior to Admission medications    Medication Sig Last Dose Taking? Auth Provider   BusPIRone HCl (BUSPAR PO) Take 10 mg by mouth 2 times daily 5/16/2018 at Unknown time Yes Reported, Patient   etanercept (ENBREL) 25 MG vial injection kit Inject 25 mg Subcutaneous once a week Past Week at Unknown time Yes Reported, Patient   HYDROXYZINE PAMOATE PO  5/16/2018 at Unknown time Yes Reported, Patient   QUETIAPINE FUMARATE PO  5/16/2018 at Unknown time Yes Reported, Patient   VENLAFAXINE HCL PO  5/16/2018 at Unknown time Yes Reported, Patient             Review of Systems:   A Comprehensive greater than 10 system review of systems was carried out.  Pertinent positives and negatives are noted above.  Otherwise negative for contributory information.           Physical Exam:   Blood pressure 121/72, temperature 96.6  F (35.9  C), resp. rate 21, height 1.905 m (6' 3\"), weight 102.1 kg (225 lb), SpO2 98 %.  Wt Readings from Last 1 Encounters:   05/16/18 102.1 kg (225 lb)     Exam:  GENERAL: No apparent distress. Awake, alert, and fully oriented.  HEENT: Normocephalic, atraumatic. Extraocular movements intact.  CARDIOVASCULAR: Regular rate and rhythm without murmurs or rubs. No S3.  PULMONARY: Clear to auscultation bilaterally.  ABDOMINAL: Soft, non-tender, non-distended. Bowel sounds normoactive.   EXTREMITIES: No cyanosis or clubbing. No appreciable edema.  NEUROLOGICAL: CN 2-12 grossly intact, no focal neurological deficits.  DERMATOLOGICAL: No rash, ulcer, bruising, nor jaundice.          Data:   Laboratory:    Recent Labs  Lab 05/16/18  2355   WBC 9.3   HGB " 13.7   HCT 39.6*   MCV 87          Recent Labs  Lab 05/16/18  2355      POTASSIUM 3.3*   CHLORIDE 104   CO2 21   ANIONGAP 10   *   BUN 17   CR 0.90   GFRESTIMATED >90   GFRESTBLACK >90   DORIAN 8.1*       Recent Labs  Lab 05/16/18  2355   DD 1.5*       Recent Labs  Lab 05/16/18  2355   AST 50*   ALT 60   ALKPHOS 102   BILITOTAL 0.2       Recent Labs  Lab 05/16/18  2355   LIPASE 107       Recent Labs  Lab 05/16/18  2355   TROPI <0.015     No results for input(s): CULT in the last 168 hours.    Imaging:  Recent Results (from the past 24 hour(s))   POC US CHEST B-SCAN    Impression    [facility] Procedure Note      Limited Bedside ED Cardiac Ultrasound:    PROCEDURE: PERFORMED BY: Dr. Luis He  INDICATIONS/SYMPTOM:  Chest Pain and Shortness of Breath  PROBE: Cardiac phased array probe  BODY LOCATION: Chest  FINDINGS:   The ultrasound was performed utilizing the parasternal long axis and apical 4 chamber views.  Cardiac contractility:  Present  Gross estimation of cardiac kinesis: normal  Pericardial Effusion:  None  RV:LV ratio: LV > RV  + lung sliding bilateral, no pleural effusion, RUQ no cholelithiasis, no RUQ ff.     INTERPRETATION:    Chamber size and motion were grossly normal with LV > RV, normal cardiac kinesis.  No pericardial effusion was found. No Ptx, or pleural effusion, limited RUQ views show no cholelithiasis or free fluid    IMAGE DOCUMENTATION: Images were archived to PACs system.         CT Chest/Abdomen/Pelvis w Contrast   Result Value    Radiologist flags Pulmonary embolism (AA)    Narrative    CT CHEST/ABDOMEN/PELVIS W CONTRAST 5/17/2018 1:16 AM     HISTORY: Chest pain and dyspnea. Positive d-dimer. Possible pulmonary  embolism. Patient also has right upper quadrant and right lower  quadrant abdominal pain.     TECHNIQUE: Volumetric acquisition through chest, abdomen and pelvis  with IV contrast.   100mL Isovue-370  Radiation dose for this scan was  reduced  using automated exposure control, adjustment of the mA and/or  kV according to patient size, or iterative reconstruction technique.    COMPARISON: None.    FINDINGS:   Chest: There is a small pulmonary embolus in the left upper lobe  pulmonary artery extending slightly into left upper lobe and lingular  branch vessels. There are 2 small subtle emboli in the right upper  lung. Small embolus in the right middle lobe. Some of the peripheral  pulmonary arteries are not optimally opacified and there is some  motion artifact. No mediastinal or hilar abnormality. No acute  infiltrates, pleural effusions or pneumothorax. Linear atelectasis or  scarring right lower lung.    Abdomen and pelvis: Liver, gallbladder, spleen, pancreas, adrenal  glands and kidneys demonstrate no worrisome findings. Small cyst lower  right kidney. No hydronephrosis.    Pelvic structures within normal limits. Moderate bowel fluid including  fluid throughout the colon. No focal inflammatory changes. Multiple  small mesenteric lymph nodes. No bowel obstruction or ascites.      Impression    IMPRESSION:  1. Study is positive for a few small bilateral pulmonary emboli.  2. Moderate small bowel and colonic fluid which may be due to  enteritis.    [Critical Result: Pulmonary embolism]    Finding was identified on 5/17/2018 1:19 AM.     Dr. He was contacted by me on 5/17/2018 1:27 AM and verbalized  understanding of the critical result.     MD Sergey GARZON DO MPH  [facility] Hospitalist  [address] [place] [address]  Pager: [phone]  05/17/2018

## 2018-05-17 NOTE — ED NOTES
[facility]  ED Nurse Handoff Report    Juan Pablo Peterson is a 37 year old male   ED Chief complaint: Shortness of Breath and Chest Pain  . ED Diagnosis:   Final diagnoses:   Other acute pulmonary embolism without acute cor pulmonale (H)     Allergies: No Known Allergies    Code Status: Full Code  Activity level - Baseline/Home:  Independent. Activity Level - Current:   Independent. Lift room needed: No. Bariatric: No   Needed: No   Isolation: No. Infection: Not Applicable.     Vital Signs:   Vitals:    05/17/18 0130 05/17/18 0145 05/17/18 0200 05/17/18 0215   BP: 121/76 124/74 116/68 121/70   Resp:       Temp:       SpO2: 95% 98% 95% 93%   Weight:       Height:           Cardiac Rhythm:  ,      Pain level: 0-10 Pain Scale: 1  Patient confused: No. Patient Falls Risk: No.   Elimination Status: has not voided   Patient Report - Initial Complaint: Hx pericardial infusion after influenza type symptoms. Pt reports symptoms began Sunday. Pt c/o CP and SOB and reports symptoms similar to past. Diaphoretic. ABC in tact. A/OX4. Focused Assessment:   Cardiac - Cardiac WDL:  WDL except  Review of Systems (Cardiac) - Cardiac Signs/Symptoms: chest pain; shortness of breath  Chest Pain Assessment - Rating (0-10): 4  Chest Pain Assessment - Chest Pain Location: midsternal (pressure, states had pericarditis after flu) WB    22:45 Respiratory Respiratory - Respiratory WDL:  WDL except (complains of dyspnea, bagan last evening) Lung Fields: All Fields Throughout All Lung Fields: clear       Tests Performed:   Labs Ordered and Resulted from Time of ED Arrival Up to the Time of Departure from the ED   CBC WITH PLATELETS DIFFERENTIAL - Abnormal; Notable for the following:        Result Value    Hematocrit 39.6 (*)     All other components within normal limits   COMPREHENSIVE METABOLIC PANEL - Abnormal; Notable for the following:     Potassium 3.3 (*)     Glucose 107 (*)     Calcium 8.1 (*)     Albumin 3.1 (*)      AST 50 (*)     All other components within normal limits   D DIMER QUANTITATIVE - Abnormal; Notable for the following:     D Dimer 1.5 (*)     All other components within normal limits   LIPASE   MONONUCLEOSIS SCREEN   TROPONIN I   MEASURE WEIGHT   PLATELETS MONITORED PER HEPARIN TREATMENT PROTOCOL (FOR MEANINGFUL USE     . Abnormal Results: ddime 1.5, chest CT shows sm PE in left upper lobe, 2 sm PE in Righ upper lobe and 1 in right middle lobe  Treatments provided:   Medications   heparin infusion 25,000 units in 0.45% NaCl 250 mL (1,600 Units/hr Intravenous New Bag 5/17/18 0237)   lactated ringers BOLUS 1,000 mL (1,000 mLs Intravenous New Bag 5/16/18 2351)   ondansetron (ZOFRAN) injection 4 mg (4 mg Intravenous Given 5/16/18 2353)   ketorolac (TORADOL) injection 15 mg (15 mg Intravenous Given 5/16/18 2354)   0.9% sodium chloride BOLUS (0 mLs Intravenous Stopped 5/17/18 0112)   iopamidol (ISOVUE-370) solution 500 mL (100 mLs Intravenous Given 5/17/18 0101)   heparin Loading Dose bolus dose from infusion pump 7,200 Units (7,200 Units Intravenous Given 5/17/18 0232)       Family Comments: mother present  OBS brochure/video discussed/provided to patient:  N/A  ED Medications:   Medications   heparin infusion 25,000 units in 0.45% NaCl 250 mL (1,600 Units/hr Intravenous New Bag 5/17/18 0237)   lactated ringers BOLUS 1,000 mL (1,000 mLs Intravenous New Bag 5/16/18 2351)   ondansetron (ZOFRAN) injection 4 mg (4 mg Intravenous Given 5/16/18 2353)   ketorolac (TORADOL) injection 15 mg (15 mg Intravenous Given 5/16/18 2354)   0.9% sodium chloride BOLUS (0 mLs Intravenous Stopped 5/17/18 0112)   iopamidol (ISOVUE-370) solution 500 mL (100 mLs Intravenous Given 5/17/18 0101)   heparin Loading Dose bolus dose from infusion pump 7,200 Units (7,200 Units Intravenous Given 5/17/18 0232)     Drips infusing:  Yes heparin  For the majority of the shift, the patient's behavior Green. Interventions performed were NA.     Severe  Sepsis OR Septic Shock Diagnosis Present: No      ED Nurse Name/Phone Number: SAMIR DELAROSA,   2:43 AM    RECEIVING UNIT ED HANDOFF REVIEW    Above ED Nurse Handoff Report was reviewed: Yes  Reviewed by: Nikkie Thomas on May 17, 2018 at 2:49 AM

## 2018-05-17 NOTE — PROGRESS NOTES
Pt ambulated in hallway with standby assistance and O2 monitoring on room air. Pt denied dizziness/lightheadedness, SOB or difficulty breathing. O2 saturation was between 92-97% and heart rate was 94-99 bpm.

## 2018-05-17 NOTE — PLAN OF CARE
Problem: Patient Care Overview  Goal: Plan of Care/Patient Progress Review  Outcome: No Change  ROOM # 229    Living Situation (if not independent, order SW consult): lives with mother  Facility name:  : [facility]    Activity level at baseline: ind  Activity level on admit: ind      Patient registered to observation; given Patient Bill of Rights; given the opportunity to ask questions about observation status and their plan of care.  Patient has been oriented to the observation room, bathroom and call light is in place.    Discussed discharge goals and expectations with patient/family.

## 2025-08-27 ENCOUNTER — HOSPITAL ENCOUNTER (EMERGENCY)
Facility: CLINIC | Age: 45
Discharge: HOME | End: 2025-08-28
Attending: EMERGENCY MEDICINE | Admitting: EMERGENCY MEDICINE
Payer: COMMERCIAL

## 2025-08-27 VITALS
WEIGHT: 230 LBS | BODY MASS INDEX: 28.6 KG/M2 | TEMPERATURE: 98.5 F | OXYGEN SATURATION: 96 % | RESPIRATION RATE: 20 BRPM | HEIGHT: 75 IN | SYSTOLIC BLOOD PRESSURE: 118 MMHG | HEART RATE: 93 BPM | DIASTOLIC BLOOD PRESSURE: 78 MMHG

## 2025-08-27 DIAGNOSIS — R45.851 SUICIDAL IDEATION: ICD-10-CM

## 2025-08-27 DIAGNOSIS — F41.8 DEPRESSION WITH ANXIETY: Primary | ICD-10-CM

## 2025-08-27 DIAGNOSIS — F43.9 SITUATIONAL STRESS: ICD-10-CM

## 2025-08-27 PROCEDURE — 99285 EMERGENCY DEPT VISIT HI MDM: CPT | Performed by: EMERGENCY MEDICINE

## 2025-08-27 RX ORDER — HYDROXYZINE HYDROCHLORIDE 25 MG/1
25 TABLET, FILM COATED ORAL EVERY 4 HOURS PRN
Status: DISCONTINUED | OUTPATIENT
Start: 2025-08-27 | End: 2025-08-28 | Stop reason: HOSPADM

## 2025-08-27 ASSESSMENT — COLUMBIA-SUICIDE SEVERITY RATING SCALE - C-SSRS
1. IN THE PAST MONTH, HAVE YOU WISHED YOU WERE DEAD OR WISHED YOU COULD GO TO SLEEP AND NOT WAKE UP?: YES
4. HAVE YOU HAD THESE THOUGHTS AND HAD SOME INTENTION OF ACTING ON THEM?: YES
3. HAVE YOU BEEN THINKING ABOUT HOW YOU MIGHT KILL YOURSELF?: YES
2. HAVE YOU ACTUALLY HAD ANY THOUGHTS OF KILLING YOURSELF IN THE PAST MONTH?: YES
6. HAVE YOU EVER DONE ANYTHING, STARTED TO DO ANYTHING, OR PREPARED TO DO ANYTHING TO END YOUR LIFE?: NO
5. HAVE YOU STARTED TO WORK OUT OR WORKED OUT THE DETAILS OF HOW TO KILL YOURSELF? DO YOU INTEND TO CARRY OUT THIS PLAN?: NO

## 2025-08-27 ASSESSMENT — ACTIVITIES OF DAILY LIVING (ADL)
ADLS_ACUITY_SCORE: 41

## 2025-08-28 PROBLEM — F32.A DEPRESSION, UNSPECIFIED: Status: ACTIVE | Noted: 2025-08-28

## 2025-08-28 PROCEDURE — 250N000013 HC RX MED GY IP 250 OP 250 PS 637: Performed by: EMERGENCY MEDICINE

## 2025-08-28 RX ORDER — LORAZEPAM 1 MG/1
1 TABLET ORAL ONCE
Status: COMPLETED | OUTPATIENT
Start: 2025-08-28 | End: 2025-08-28

## 2025-08-28 RX ADMIN — LORAZEPAM 1 MG: 1 TABLET ORAL at 00:10

## 2025-08-28 ASSESSMENT — ACTIVITIES OF DAILY LIVING (ADL)
ADLS_ACUITY_SCORE: 41
ADLS_ACUITY_SCORE: 41

## 2025-08-31 ENCOUNTER — HOSPITAL ENCOUNTER (OUTPATIENT)
Facility: CLINIC | Age: 45
Setting detail: OBSERVATION
Discharge: HOME | End: 2025-09-02
Attending: SOCIAL WORKER
Payer: COMMERCIAL

## 2025-08-31 DIAGNOSIS — F41.9 ANXIETY: Primary | ICD-10-CM

## 2025-08-31 DIAGNOSIS — F41.0 PANIC ATTACK: ICD-10-CM

## 2025-08-31 PROBLEM — R45.851 SUICIDAL IDEATION: Status: ACTIVE | Noted: 2025-08-31

## 2025-08-31 PROCEDURE — 250N000013 HC RX MED GY IP 250 OP 250 PS 637: Performed by: NURSE PRACTITIONER

## 2025-08-31 PROCEDURE — G0378 HOSPITAL OBSERVATION PER HR: HCPCS

## 2025-08-31 PROCEDURE — 99284 EMERGENCY DEPT VISIT MOD MDM: CPT | Performed by: SOCIAL WORKER

## 2025-08-31 RX ORDER — ACETAMINOPHEN 325 MG/1
650 TABLET ORAL EVERY 4 HOURS PRN
Status: DISCONTINUED | OUTPATIENT
Start: 2025-08-31 | End: 2025-09-01

## 2025-08-31 RX ORDER — HYDROXYZINE HYDROCHLORIDE 50 MG/1
50 TABLET, FILM COATED ORAL EVERY 6 HOURS PRN
Status: DISCONTINUED | OUTPATIENT
Start: 2025-08-31 | End: 2025-09-02 | Stop reason: HOSPADM

## 2025-08-31 RX ORDER — BUPROPION HYDROCHLORIDE 150 MG/1
150 TABLET ORAL DAILY
COMMUNITY
Start: 2025-07-11

## 2025-08-31 RX ORDER — BUPROPION HYDROCHLORIDE 300 MG/1
300 TABLET ORAL DAILY
COMMUNITY
Start: 2025-07-11 | End: 2026-07-11

## 2025-08-31 RX ORDER — PROPRANOLOL HYDROCHLORIDE 10 MG/1
10 TABLET ORAL 2 TIMES DAILY
Status: DISCONTINUED | OUTPATIENT
Start: 2025-08-31 | End: 2025-09-02 | Stop reason: HOSPADM

## 2025-08-31 RX ORDER — DULOXETIN HYDROCHLORIDE 30 MG/1
30 CAPSULE, DELAYED RELEASE ORAL DAILY
Status: DISCONTINUED | OUTPATIENT
Start: 2025-09-01 | End: 2025-09-02 | Stop reason: HOSPADM

## 2025-08-31 RX ORDER — PROPRANOLOL HYDROCHLORIDE 10 MG/1
10 TABLET ORAL 2 TIMES DAILY
COMMUNITY
Start: 2025-07-11

## 2025-08-31 RX ORDER — DULOXETIN HYDROCHLORIDE 30 MG/1
30 CAPSULE, DELAYED RELEASE ORAL DAILY
COMMUNITY
Start: 2025-07-11

## 2025-08-31 RX ORDER — DULOXETIN HYDROCHLORIDE 60 MG/1
60 CAPSULE, DELAYED RELEASE ORAL DAILY
COMMUNITY
Start: 2025-07-11

## 2025-08-31 RX ORDER — BUPROPION HYDROCHLORIDE 300 MG/1
300 TABLET ORAL DAILY
Status: DISCONTINUED | OUTPATIENT
Start: 2025-09-01 | End: 2025-09-02 | Stop reason: HOSPADM

## 2025-08-31 RX ORDER — HYDROXYZINE HYDROCHLORIDE 25 MG/1
25 TABLET, FILM COATED ORAL EVERY 6 HOURS PRN
Status: DISCONTINUED | OUTPATIENT
Start: 2025-08-31 | End: 2025-09-02 | Stop reason: HOSPADM

## 2025-08-31 RX ORDER — DULOXETIN HYDROCHLORIDE 60 MG/1
60 CAPSULE, DELAYED RELEASE ORAL DAILY
Status: DISCONTINUED | OUTPATIENT
Start: 2025-09-01 | End: 2025-09-02 | Stop reason: HOSPADM

## 2025-08-31 RX ORDER — BUPROPION HYDROCHLORIDE 150 MG/1
150 TABLET ORAL DAILY
Status: DISCONTINUED | OUTPATIENT
Start: 2025-09-01 | End: 2025-09-02 | Stop reason: HOSPADM

## 2025-08-31 RX ADMIN — PROPRANOLOL HYDROCHLORIDE 10 MG: 10 TABLET ORAL at 21:43

## 2025-08-31 ASSESSMENT — COLUMBIA-SUICIDE SEVERITY RATING SCALE - C-SSRS
1. IN THE PAST MONTH, HAVE YOU WISHED YOU WERE DEAD OR WISHED YOU COULD GO TO SLEEP AND NOT WAKE UP?: NO
6. HAVE YOU EVER DONE ANYTHING, STARTED TO DO ANYTHING, OR PREPARED TO DO ANYTHING TO END YOUR LIFE?: NO
2. HAVE YOU ACTUALLY HAD ANY THOUGHTS OF KILLING YOURSELF IN THE PAST MONTH?: NO

## 2025-08-31 ASSESSMENT — ACTIVITIES OF DAILY LIVING (ADL)
ADLS_ACUITY_SCORE: 41

## 2025-09-01 PROCEDURE — 250N000013 HC RX MED GY IP 250 OP 250 PS 637: Performed by: NURSE PRACTITIONER

## 2025-09-01 PROCEDURE — 250N000013 HC RX MED GY IP 250 OP 250 PS 637: Performed by: PSYCHIATRY & NEUROLOGY

## 2025-09-01 PROCEDURE — 250N000013 HC RX MED GY IP 250 OP 250 PS 637

## 2025-09-01 PROCEDURE — G0378 HOSPITAL OBSERVATION PER HR: HCPCS

## 2025-09-01 RX ORDER — TRAZODONE HYDROCHLORIDE 50 MG/1
50 TABLET ORAL
Status: DISCONTINUED | OUTPATIENT
Start: 2025-09-01 | End: 2025-09-01

## 2025-09-01 RX ORDER — QUETIAPINE FUMARATE 50 MG/1
50 TABLET, FILM COATED ORAL AT BEDTIME
Status: DISCONTINUED | OUTPATIENT
Start: 2025-09-01 | End: 2025-09-02 | Stop reason: HOSPADM

## 2025-09-01 RX ORDER — TRAZODONE HYDROCHLORIDE 50 MG/1
50 TABLET ORAL
Status: DISCONTINUED | OUTPATIENT
Start: 2025-09-01 | End: 2025-09-02 | Stop reason: HOSPADM

## 2025-09-01 RX ORDER — GABAPENTIN 100 MG/1
100 CAPSULE ORAL 3 TIMES DAILY
Status: DISCONTINUED | OUTPATIENT
Start: 2025-09-01 | End: 2025-09-02 | Stop reason: HOSPADM

## 2025-09-01 RX ORDER — ACETAMINOPHEN 325 MG/1
650 TABLET ORAL EVERY 6 HOURS PRN
Status: DISCONTINUED | OUTPATIENT
Start: 2025-09-01 | End: 2025-09-02 | Stop reason: HOSPADM

## 2025-09-01 RX ORDER — QUETIAPINE FUMARATE 25 MG/1
25 TABLET, FILM COATED ORAL 3 TIMES DAILY PRN
Status: DISCONTINUED | OUTPATIENT
Start: 2025-09-01 | End: 2025-09-02 | Stop reason: HOSPADM

## 2025-09-01 RX ADMIN — GABAPENTIN 100 MG: 100 CAPSULE ORAL at 22:11

## 2025-09-01 RX ADMIN — BUPROPION HYDROCHLORIDE 300 MG: 300 TABLET, EXTENDED RELEASE ORAL at 07:57

## 2025-09-01 RX ADMIN — PROPRANOLOL HYDROCHLORIDE 10 MG: 10 TABLET ORAL at 20:10

## 2025-09-01 RX ADMIN — QUETIAPINE FUMARATE 25 MG: 25 TABLET ORAL at 00:13

## 2025-09-01 RX ADMIN — QUETIAPINE FUMARATE 25 MG: 25 TABLET ORAL at 17:46

## 2025-09-01 RX ADMIN — GABAPENTIN 100 MG: 100 CAPSULE ORAL at 13:22

## 2025-09-01 RX ADMIN — TRAZODONE HYDROCHLORIDE 50 MG: 50 TABLET ORAL at 01:21

## 2025-09-01 RX ADMIN — BUPROPION HYDROCHLORIDE 150 MG: 150 TABLET, EXTENDED RELEASE ORAL at 07:57

## 2025-09-01 RX ADMIN — RIVAROXABAN 20 MG: 20 TABLET, FILM COATED ORAL at 17:42

## 2025-09-01 RX ADMIN — DULOXETINE HYDROCHLORIDE 30 MG: 30 CAPSULE, DELAYED RELEASE ORAL at 07:58

## 2025-09-01 RX ADMIN — QUETIAPINE FUMARATE 50 MG: 50 TABLET ORAL at 22:11

## 2025-09-01 RX ADMIN — PROPRANOLOL HYDROCHLORIDE 10 MG: 10 TABLET ORAL at 07:59

## 2025-09-01 RX ADMIN — DULOXETINE HYDROCHLORIDE 60 MG: 60 CAPSULE, DELAYED RELEASE PELLETS ORAL at 07:58

## 2025-09-01 ASSESSMENT — ACTIVITIES OF DAILY LIVING (ADL)
ADLS_ACUITY_SCORE: 41

## 2025-09-02 VITALS
OXYGEN SATURATION: 95 % | DIASTOLIC BLOOD PRESSURE: 72 MMHG | HEART RATE: 64 BPM | TEMPERATURE: 97 F | BODY MASS INDEX: 27.48 KG/M2 | SYSTOLIC BLOOD PRESSURE: 107 MMHG | WEIGHT: 221 LBS | HEIGHT: 75 IN | RESPIRATION RATE: 18 BRPM

## 2025-09-02 LAB — SARS-COV-2 RNA RESP QL NAA+PROBE: NEGATIVE

## 2025-09-02 PROCEDURE — 250N000013 HC RX MED GY IP 250 OP 250 PS 637

## 2025-09-02 PROCEDURE — 99239 HOSP IP/OBS DSCHRG MGMT >30: CPT | Performed by: PSYCHIATRY & NEUROLOGY

## 2025-09-02 PROCEDURE — 250N000013 HC RX MED GY IP 250 OP 250 PS 637: Performed by: PSYCHIATRY & NEUROLOGY

## 2025-09-02 PROCEDURE — 87635 SARS-COV-2 COVID-19 AMP PRB: CPT | Performed by: PSYCHIATRY & NEUROLOGY

## 2025-09-02 PROCEDURE — 250N000013 HC RX MED GY IP 250 OP 250 PS 637: Performed by: NURSE PRACTITIONER

## 2025-09-02 PROCEDURE — G0378 HOSPITAL OBSERVATION PER HR: HCPCS

## 2025-09-02 RX ORDER — QUETIAPINE FUMARATE 50 MG/1
50 TABLET, FILM COATED ORAL AT BEDTIME
Qty: 15 TABLET | Refills: 0 | Status: SHIPPED | OUTPATIENT
Start: 2025-09-02 | End: 2025-09-17

## 2025-09-02 RX ORDER — GABAPENTIN 100 MG/1
100 CAPSULE ORAL 3 TIMES DAILY
Qty: 45 CAPSULE | Refills: 0 | Status: SHIPPED | OUTPATIENT
Start: 2025-09-02 | End: 2025-09-17

## 2025-09-02 RX ADMIN — BUPROPION HYDROCHLORIDE 300 MG: 300 TABLET, EXTENDED RELEASE ORAL at 09:07

## 2025-09-02 RX ADMIN — QUETIAPINE FUMARATE 25 MG: 25 TABLET ORAL at 09:09

## 2025-09-02 RX ADMIN — PROPRANOLOL HYDROCHLORIDE 10 MG: 10 TABLET ORAL at 09:09

## 2025-09-02 RX ADMIN — DULOXETINE HYDROCHLORIDE 30 MG: 30 CAPSULE, DELAYED RELEASE ORAL at 09:08

## 2025-09-02 RX ADMIN — GABAPENTIN 100 MG: 100 CAPSULE ORAL at 09:08

## 2025-09-02 RX ADMIN — BUPROPION HYDROCHLORIDE 150 MG: 150 TABLET, EXTENDED RELEASE ORAL at 09:07

## 2025-09-02 RX ADMIN — GABAPENTIN 100 MG: 100 CAPSULE ORAL at 13:14

## 2025-09-02 RX ADMIN — DULOXETINE HYDROCHLORIDE 60 MG: 60 CAPSULE, DELAYED RELEASE PELLETS ORAL at 09:08

## 2025-09-02 ASSESSMENT — ACTIVITIES OF DAILY LIVING (ADL)
ADLS_ACUITY_SCORE: 41

## 2025-09-02 ASSESSMENT — COLUMBIA-SUICIDE SEVERITY RATING SCALE - C-SSRS
2. HAVE YOU ACTUALLY HAD ANY THOUGHTS OF KILLING YOURSELF?: NO
SUICIDE, SINCE LAST CONTACT: NO
6. HAVE YOU EVER DONE ANYTHING, STARTED TO DO ANYTHING, OR PREPARED TO DO ANYTHING TO END YOUR LIFE?: NO
TOTAL  NUMBER OF INTERRUPTED ATTEMPTS SINCE LAST CONTACT: NO
TOTAL  NUMBER OF ABORTED OR SELF INTERRUPTED ATTEMPTS SINCE LAST CONTACT: NO
1. SINCE LAST CONTACT, HAVE YOU WISHED YOU WERE DEAD OR WISHED YOU COULD GO TO SLEEP AND NOT WAKE UP?: NO
ATTEMPT SINCE LAST CONTACT: NO

## 2025-09-03 ENCOUNTER — TELEPHONE (OUTPATIENT)
Dept: BEHAVIORAL HEALTH | Facility: CLINIC | Age: 45
End: 2025-09-03
Payer: COMMERCIAL

## 2025-09-04 ENCOUNTER — PATIENT OUTREACH (OUTPATIENT)
Dept: CARE COORDINATION | Facility: CLINIC | Age: 45
End: 2025-09-04
Payer: COMMERCIAL